# Patient Record
Sex: FEMALE | Race: BLACK OR AFRICAN AMERICAN | NOT HISPANIC OR LATINO | URBAN - NONMETROPOLITAN AREA
[De-identification: names, ages, dates, MRNs, and addresses within clinical notes are randomized per-mention and may not be internally consistent; named-entity substitution may affect disease eponyms.]

---

## 2018-01-15 NOTE — PATIENT DISCUSSION
Surgery Counseling:  I have discussed the option of glasses versus cataract surgery versus following, It was explained that when vision no longer meets the patient's visual needs and a new prescription for glasses is not likely to improve the patient's visual symptoms, the option of cataract surgery is a reasonable next step. It was explained that there is no guarantee that removing the cataract will improve their visual symptoms; however, it is believed that the cataract is contributing to the patient's visual impairment and surgery may noticeably improve both the visual and functional status of the patient. After this discussion, the patient desires to proceed with cataract surgery with implantation of an intraocular lens to improve their vision for driving and watching TV.

## 2018-02-15 NOTE — PATIENT DISCUSSION
New Prescription: Prolensa (bromfenac): drops: 0.07% 1 drop every morning as directed into right eye 02-

## 2018-02-15 NOTE — PATIENT DISCUSSION
New Prescription: Besivance (besifloxacin): drops,suspension: 0.6% 1 drop three times a day as directed into right eye 02-

## 2018-02-15 NOTE — PATIENT DISCUSSION
New Prescription: Pred Forte (prednisolone acetate): drops,suspension: 1% 1 drop three times a day into right eye 02-

## 2018-02-28 NOTE — PATIENT DISCUSSION
New Prescription: [address] (brinzolamide-brimonidine): drops,suspension: 1-0.2% 1 drop twice a day into both eyes 02-

## 2018-02-28 NOTE — PATIENT DISCUSSION
Continue: Pred Forte (prednisolone acetate): drops,suspension: 1% 1 drop three times a day into right eye 02-

## 2018-02-28 NOTE — PATIENT DISCUSSION
Continue: Besivance (besifloxacin): drops,suspension: 0.6% 1 drop three times a day as directed into right eye 02-

## 2018-02-28 NOTE — PATIENT DISCUSSION
S/P PHACO W/IOL,OD: ADD SIMBRINZA BID OD TO CONTROL IOP UNTIL NEXT POST OP VISIT. DOING WELL. CONTINUE TO TAPER DROPS AS DIRECTED. SPECS RX OFFERED. RX ARC IN SPECS TO MINIMIZE GLARE. RETURN FOR FOLLOW-UP AS SCHEDULED.

## 2018-03-15 NOTE — PATIENT DISCUSSION
Continue: Franklin Jackson (brinzolamide-brimonidine): drops,suspension: 1-0.2% 1 drop twice a day into both eyes 02-

## 2018-03-15 NOTE — PATIENT DISCUSSION
Continue as directed in the right eye for one more week. Start 5 days prior to surgery in the left eye.

## 2018-03-15 NOTE — PATIENT DISCUSSION
Taper as directed for one more week in the right eye. Start immediately after surgery in the left eye.

## 2018-03-15 NOTE — PATIENT DISCUSSION
*Pre-Op 2nd Eye Counseling: The patient has noticed an improvement in their visual symptoms in the operative eye. The patient complains of decreased vision in the fellow eye when watching TV and when bothered by glare from headlights. It was explained to the patient that the decision to proceed with cataract surgery in the fellow eye is entirely a separate decision from the surgical eye. All of the same risks, benefits and alternatives ere reviewed with the patient again. The patient does feel the vision in the non-operative eye is limiting their daily activities and elects to proceed with surgery in the cataract eye.

## 2018-03-28 NOTE — PATIENT DISCUSSION
S/P PHACO W/IOL,OS: FLUID RELEASED FROM AB EXTERNA INCISION AT SLIT LAMP WITHOUT DIFFICULTY. REPEAT IOP CHECK BY DR. Denise Gomes (14). INSTILLED ONE DROP OF ANTIBIOTIC IMMEDIATELY. ADD SIMBRENZA BID OS TO CONTROL IOP UNTIL NEXT POST OP VISIT. CONTINUE TO TAPER DROPS AS DIRECTED. DISCONTINUE ANTIBIOTIC DROP IN 1 WEEK. RETURN FOR FOLLOW-UP AS SCHEDULED.

## 2018-03-28 NOTE — PATIENT DISCUSSION
New Prescription: [address] (brinzolamide-brimonidine): drops,suspension: 1-0.2% 1 drop twice a day into left eye 02-

## 2018-03-28 NOTE — PATIENT DISCUSSION
Continue: Pred Forte (prednisolone acetate): drops,suspension: 1% 1 drop three times a day into affected eye 02-

## 2018-03-28 NOTE — PATIENT DISCUSSION
Continue: Besivance (besifloxacin): drops,suspension: 0.6% 1 drop three times a day as directed into left eye 02-

## 2018-04-18 NOTE — PATIENT DISCUSSION
Continue: Veronica Staples (brinzolamide-brimonidine): drops,suspension: 1-0.2% 1 drop twice a day into left eye 02-

## 2018-04-18 NOTE — PATIENT DISCUSSION
Continue: Pred Forte (prednisolone acetate): drops,suspension: 1% 1 drop three times a day into left eye 04-

## 2018-04-18 NOTE — PATIENT DISCUSSION
Post-Op Instructions OS: Pred Forte (Prednisolone) 1 time per day for 1 week, then discontinue. Prolensa (Bromfenac) 1 time per day for 1 week, then discontinue.

## 2019-01-07 NOTE — PATIENT DISCUSSION
AMD (DRY), OU:  APPEARS STABLE. NO NEED FOR IMMEDIATE TREATMENT WITH RETINAL SPECIALIST AT THIS POINT. PRESCRIBE AREDS 2 VITAMINS / AMSLER GRID DAILY / UV PROTECTION. SMOKING AVOIDANCE ADVISED. PATIENT TO CONTACT OFFICE IMMEDIATELY IF ANY CHANGES IN AMSLER GRID OCCUR. REFER TO RETINAL SPECIALIST IN 6 MONTHS TO ESTABLISH CARE.

## 2020-08-10 NOTE — PATIENT DISCUSSION
Epiretinal Membrane Counseling: The diagnosis and natural history of epiretinal membrane was discussed with the patient including the risk of progression with retinal traction resulting in visual distortion. Patient instructed on how to do [address] to check for distortion in vision, The patient is instructed to call back immediately if any vision changes, and keep follow up as scheduled.

## 2020-08-10 NOTE — PATIENT DISCUSSION
EPIRETINAL MEMBRANE, OS: MINIMAL VISUAL SIGNIFICANCE TO THE PATIENT AT THIS TIME. FOLLOW WITH DR. Collin Arrieta AS SCHEDULED.

## 2021-01-05 ENCOUNTER — IMPORTED ENCOUNTER (OUTPATIENT)
Dept: URBAN - NONMETROPOLITAN AREA CLINIC 1 | Facility: CLINIC | Age: 46
End: 2021-01-05

## 2021-01-05 PROBLEM — H52.03: Noted: 2021-01-05

## 2021-01-05 PROCEDURE — 92015 DETERMINE REFRACTIVE STATE: CPT

## 2021-01-05 PROCEDURE — 92004 COMPRE OPH EXAM NEW PT 1/>: CPT

## 2021-08-12 NOTE — PATIENT DISCUSSION
Epiretinal Membrane Counseling: The diagnosis and natural history of epiretinal membrane was discussed with the patient including the risk of progression with retinal traction resulting in visual distortion. Patient instructed on how to do [address] to check for distortion in vision, The patient is instructed to call back immediately if any vision changes, and keep follow up as scheduled.

## 2022-02-11 NOTE — PATIENT DISCUSSION
AREDs supplements not required as patient does not meet criteria for benefit based on AREDs studies.

## 2022-04-09 ASSESSMENT — VISUAL ACUITY
OS_PH: 20/30
OD_GLARE: 20/30
OS_SC: J1
OS_GLARE: 20/50
OD_SC: J1
OD_CC: 20/25-2
OS_CC: 20/40-

## 2022-04-09 ASSESSMENT — TONOMETRY
OD_IOP_MMHG: 13
OS_IOP_MMHG: 13

## 2023-06-28 ENCOUNTER — NEW PATIENT (OUTPATIENT)
Dept: URBAN - METROPOLITAN AREA CLINIC 1 | Facility: CLINIC | Age: 48
End: 2023-06-28

## 2023-06-28 DIAGNOSIS — Z01.00: ICD-10-CM

## 2023-06-28 PROCEDURE — 92015 DETERMINE REFRACTIVE STATE: CPT

## 2023-06-28 PROCEDURE — 92004 COMPRE OPH EXAM NEW PT 1/>: CPT

## 2023-06-28 ASSESSMENT — VISUAL ACUITY
OS_CC: J2
OS_CC: 20/20
OD_SC: 20/50
OD_CC: 20/20
OD_CC: J3
OS_SC: 20/50

## 2023-06-28 ASSESSMENT — TONOMETRY
OS_IOP_MMHG: 14
OD_IOP_MMHG: 14

## 2024-06-28 ENCOUNTER — COMPREHENSIVE EXAM (OUTPATIENT)
Dept: URBAN - METROPOLITAN AREA CLINIC 1 | Facility: CLINIC | Age: 49
End: 2024-06-28

## 2024-06-28 DIAGNOSIS — H52.03: ICD-10-CM

## 2024-06-28 DIAGNOSIS — Z01.00: ICD-10-CM

## 2024-06-28 PROCEDURE — 92014 COMPRE OPH EXAM EST PT 1/>: CPT

## 2024-06-28 PROCEDURE — 92015 DETERMINE REFRACTIVE STATE: CPT

## 2024-06-28 ASSESSMENT — TONOMETRY
OS_IOP_MMHG: 17
OD_IOP_MMHG: 16

## 2024-06-28 ASSESSMENT — VISUAL ACUITY
OS_CC: 20/20
OD_CC: J3
OS_CC: J3
OD_CC: 20/20